# Patient Record
Sex: MALE | Race: WHITE | NOT HISPANIC OR LATINO | URBAN - METROPOLITAN AREA
[De-identification: names, ages, dates, MRNs, and addresses within clinical notes are randomized per-mention and may not be internally consistent; named-entity substitution may affect disease eponyms.]

---

## 2017-12-22 ENCOUNTER — EMERGENCY (EMERGENCY)
Facility: HOSPITAL | Age: 32
LOS: 1 days | Discharge: HOME | End: 2017-12-22
Attending: EMERGENCY MEDICINE | Admitting: EMERGENCY MEDICINE
Payer: COMMERCIAL

## 2017-12-22 VITALS
HEART RATE: 133 BPM | SYSTOLIC BLOOD PRESSURE: 180 MMHG | DIASTOLIC BLOOD PRESSURE: 91 MMHG | RESPIRATION RATE: 20 BRPM | TEMPERATURE: 100 F | OXYGEN SATURATION: 99 %

## 2017-12-22 VITALS — HEART RATE: 96 BPM

## 2017-12-22 LAB
ALBUMIN SERPL ELPH-MCNC: 4.7 G/DL — SIGNIFICANT CHANGE UP (ref 3.3–5)
ALP SERPL-CCNC: 50 U/L — SIGNIFICANT CHANGE UP (ref 40–120)
ALT FLD-CCNC: 39 U/L RC — SIGNIFICANT CHANGE UP (ref 10–45)
ANION GAP SERPL CALC-SCNC: 20 MMOL/L — HIGH (ref 5–17)
AST SERPL-CCNC: 29 U/L — SIGNIFICANT CHANGE UP (ref 10–40)
BASOPHILS # BLD AUTO: 0.1 K/UL — SIGNIFICANT CHANGE UP (ref 0–0.2)
BASOPHILS NFR BLD AUTO: 1.1 % — SIGNIFICANT CHANGE UP (ref 0–2)
BILIRUB SERPL-MCNC: 0.3 MG/DL — SIGNIFICANT CHANGE UP (ref 0.2–1.2)
BUN SERPL-MCNC: 17 MG/DL — SIGNIFICANT CHANGE UP (ref 7–23)
CALCIUM SERPL-MCNC: 10 MG/DL — SIGNIFICANT CHANGE UP (ref 8.4–10.5)
CHLORIDE SERPL-SCNC: 95 MMOL/L — LOW (ref 96–108)
CO2 SERPL-SCNC: 21 MMOL/L — LOW (ref 22–31)
CREAT SERPL-MCNC: 1.16 MG/DL — SIGNIFICANT CHANGE UP (ref 0.5–1.3)
EOSINOPHIL # BLD AUTO: 0 K/UL — SIGNIFICANT CHANGE UP (ref 0–0.5)
EOSINOPHIL NFR BLD AUTO: 0.6 % — SIGNIFICANT CHANGE UP (ref 0–6)
GLUCOSE SERPL-MCNC: 134 MG/DL — HIGH (ref 70–99)
HCT VFR BLD CALC: 40.2 % — SIGNIFICANT CHANGE UP (ref 39–50)
HGB BLD-MCNC: 14.2 G/DL — SIGNIFICANT CHANGE UP (ref 13–17)
LYMPHOCYTES # BLD AUTO: 1.7 K/UL — SIGNIFICANT CHANGE UP (ref 1–3.3)
LYMPHOCYTES # BLD AUTO: 26.1 % — SIGNIFICANT CHANGE UP (ref 13–44)
MAGNESIUM SERPL-MCNC: 1.6 MG/DL — SIGNIFICANT CHANGE UP (ref 1.6–2.6)
MCHC RBC-ENTMCNC: 33 PG — SIGNIFICANT CHANGE UP (ref 27–34)
MCHC RBC-ENTMCNC: 35.4 GM/DL — SIGNIFICANT CHANGE UP (ref 32–36)
MCV RBC AUTO: 93.4 FL — SIGNIFICANT CHANGE UP (ref 80–100)
MONOCYTES # BLD AUTO: 0.5 K/UL — SIGNIFICANT CHANGE UP (ref 0–0.9)
MONOCYTES NFR BLD AUTO: 8.2 % — SIGNIFICANT CHANGE UP (ref 2–14)
NEUTROPHILS # BLD AUTO: 4.2 K/UL — SIGNIFICANT CHANGE UP (ref 1.8–7.4)
NEUTROPHILS NFR BLD AUTO: 64 % — SIGNIFICANT CHANGE UP (ref 43–77)
PHOSPHATE SERPL-MCNC: 2.4 MG/DL — LOW (ref 2.5–4.5)
PLATELET # BLD AUTO: 208 K/UL — SIGNIFICANT CHANGE UP (ref 150–400)
POTASSIUM SERPL-MCNC: 3.6 MMOL/L — SIGNIFICANT CHANGE UP (ref 3.5–5.3)
POTASSIUM SERPL-SCNC: 3.6 MMOL/L — SIGNIFICANT CHANGE UP (ref 3.5–5.3)
PROT SERPL-MCNC: 7.9 G/DL — SIGNIFICANT CHANGE UP (ref 6–8.3)
RBC # BLD: 4.31 M/UL — SIGNIFICANT CHANGE UP (ref 4.2–5.8)
RBC # FLD: 12.9 % — SIGNIFICANT CHANGE UP (ref 10.3–14.5)
SODIUM SERPL-SCNC: 136 MMOL/L — SIGNIFICANT CHANGE UP (ref 135–145)
WBC # BLD: 6.6 K/UL — SIGNIFICANT CHANGE UP (ref 3.8–10.5)
WBC # FLD AUTO: 6.6 K/UL — SIGNIFICANT CHANGE UP (ref 3.8–10.5)

## 2017-12-22 PROCEDURE — 99284 EMERGENCY DEPT VISIT MOD MDM: CPT

## 2017-12-22 PROCEDURE — 93005 ELECTROCARDIOGRAM TRACING: CPT

## 2017-12-22 PROCEDURE — 99284 EMERGENCY DEPT VISIT MOD MDM: CPT | Mod: 25

## 2017-12-22 PROCEDURE — 96374 THER/PROPH/DIAG INJ IV PUSH: CPT

## 2017-12-22 PROCEDURE — 85027 COMPLETE CBC AUTOMATED: CPT

## 2017-12-22 PROCEDURE — 84100 ASSAY OF PHOSPHORUS: CPT

## 2017-12-22 PROCEDURE — 80053 COMPREHEN METABOLIC PANEL: CPT

## 2017-12-22 PROCEDURE — 83735 ASSAY OF MAGNESIUM: CPT

## 2017-12-22 RX ORDER — SODIUM CHLORIDE 9 MG/ML
2000 INJECTION INTRAMUSCULAR; INTRAVENOUS; SUBCUTANEOUS ONCE
Qty: 0 | Refills: 0 | Status: COMPLETED | OUTPATIENT
Start: 2017-12-22 | End: 2017-12-22

## 2017-12-22 RX ORDER — MAGNESIUM SULFATE 500 MG/ML
2 VIAL (ML) INJECTION ONCE
Qty: 0 | Refills: 0 | Status: COMPLETED | OUTPATIENT
Start: 2017-12-22 | End: 2017-12-22

## 2017-12-22 RX ORDER — SODIUM CHLORIDE 9 MG/ML
1000 INJECTION, SOLUTION INTRAVENOUS ONCE
Qty: 0 | Refills: 0 | Status: COMPLETED | OUTPATIENT
Start: 2017-12-22 | End: 2017-12-22

## 2017-12-22 RX ADMIN — SODIUM CHLORIDE 2000 MILLILITER(S): 9 INJECTION INTRAMUSCULAR; INTRAVENOUS; SUBCUTANEOUS at 15:12

## 2017-12-22 RX ADMIN — SODIUM CHLORIDE 2000 MILLILITER(S): 9 INJECTION, SOLUTION INTRAVENOUS at 17:33

## 2017-12-22 RX ADMIN — Medication 50 GRAM(S): at 17:43

## 2017-12-22 NOTE — ED ADULT NURSE NOTE - OBJECTIVE STATEMENT
32 y.o M presents to the ED via EMS from Urgent care for "rapid heart beat". EMS states that "the Urgent Care MD did an EKG and saw SVT and called EMS". Patient states he was out drinking last night and woke up this morning with dizziness and nausea and went to the bathroom to try to throw up; states he did not throw up but felt SOB so he went to Urgent care, and Urgent care sent him to the ED for further evaluation after an EKG. Patient presents  A&Ox3 and afebrile; denies C/P and states he feels dizzy and slightly SOB, RR 20 and Spo2 99% on RA, lungs clear. Abdomen soft, nondistended and nontender; denies n/v/d, denies blood in stool, denies hematuria and dysuria. 32 y.o M presents to the ED via EMS from Urgent care for "rapid heart beat". EMS states that "the Urgent Care MD did an EKG and saw SVT and called EMS". Patient states he was out drinking last night and woke up this morning with dizziness and nausea and went to the bathroom to try to throw up; states he "did not throw up but felt SOB so he went to Urgent care, and Urgent care sent him to the ED for further evaluation after an EKG". Patient presents  A&Ox3 and afebrile; denies C/P and states he feels dizzy and slightly SOB, RR 20 and Spo2 99% on RA, lungs clear. Abdomen soft, nondistended and nontender; denies n/v/d, denies blood in stool, denies hematuria and dysuria. As per patient he does not have any significant medical history and does not take any daily medications. Cardiac monitoring initiated- Sinus Tachycardia on the monitor- .

## 2017-12-22 NOTE — ED ADULT NURSE NOTE - CHPI ED SYMPTOMS NEG
no fever/no chest pain/no cough/no syncope/no nausea/no diaphoresis/no vomiting/no back pain/no chills

## 2017-12-22 NOTE — ED PROVIDER NOTE - PROGRESS NOTE DETAILS
Patient given 3L fluid, patient feels much better, now at NSR at 96 BPM. No evidence of alcohol withdrawal, never had history of alcohol withdrawal. Will discharge home and provide outpatient follow-up with PCP.  - Noah Gao MD

## 2017-12-22 NOTE — ED ADULT NURSE REASSESSMENT NOTE - NS ED NURSE REASSESS COMMENT FT1
Patient reassessed; ambulated with steady gait- says he is feeling much better and less anxious. HR 96 and Sinus Rhythm noted on the monitor. Will continue to monitor and patient safety maintained.

## 2017-12-22 NOTE — ED PROVIDER NOTE - CARE PLAN
Principal Discharge DX:	Tachycardia  Instructions for follow-up, activity and diet:	1) Please follow-up with your primary care doctor ( general internal medicine ([phone]) ) within the next 3 days.  Please call today or tomorrow for an appointment.  If you cannot follow-up with your doctor(s), please return to the ED for any urgent issues.  2) If you have any worsening of symptoms or any other concerns please return to the ED immediately.  3) Please continue taking your home medications as directed.   4) Stay well hydrated.

## 2017-12-22 NOTE — ED PROVIDER NOTE - MEDICAL DECISION MAKING DETAILS
Attending note. Tachycardia-SVT versus a sinus tachycardia. Patient drank alcohol last night. IV fluids, a basic labs, continuous cardiac monitoring.

## 2017-12-22 NOTE — ED PROVIDER NOTE - PHYSICAL EXAMINATION
Attending note. Patient is alert and slightly nervous. Skin is dry and there is no pallor or jaundice. Patient has moist mucous membranes. Lungs are clear and equal bilaterally. There are no wheezes rales or rhonchi. Heart regular rate rhythm and tachycardic. Abdomen is soft and nontender. There is no calf tenderness or edema of the extremities. Neurologic examination is intact.

## 2017-12-22 NOTE — ED PROVIDER NOTE - OBJECTIVE STATEMENT
32y Male no PMH complaining of rapid heart beat from urgent care, started this morning. Felt dizzy, weak, and short of breath, went to urgent care, had an EKG with SVT. Took TUMS. Never happened before. Drank yesterday night, thought it was due to being hung over. No obvious sick contacts, no recent travel, no leg swelling or pain. 32y Male no PMH complaining of rapid heart beat from urgent care, started this morning. Felt dizzy, weak, and short of breath, went to urgent care, had an EKG with SVT. Took TUMS. Never happened before. Drank yesterday night, thought it was due to being hung over. No obvious sick contacts, no recent travel, no leg swelling or pain.       Attending note. Patient was seen in critical care room D. agree with above. Patient is complaining of palpitations and some lightheadedness. Patient denies any prior episodes. Patient drinks 30-40 beers per week.  Patient denies any recent illness. There has been no nausea, vomiting, diarrhea, fever or GI bleeding. The patient's father had a history of heart disease at 45 years old. Patient was seen at urgent care center and sent to the emergency department. Patient did not receive any medication from urgent care. Patient takes no medications.

## 2017-12-22 NOTE — ED PROVIDER NOTE - PLAN OF CARE
1) Please follow-up with your primary care doctor ( general internal medicine ([phone]) ) within the next 3 days.  Please call today or tomorrow for an appointment.  If you cannot follow-up with your doctor(s), please return to the ED for any urgent issues.  2) If you have any worsening of symptoms or any other concerns please return to the ED immediately.  3) Please continue taking your home medications as directed.   4) Stay well hydrated.